# Patient Record
Sex: MALE | Race: WHITE | Employment: STUDENT | URBAN - METROPOLITAN AREA
[De-identification: names, ages, dates, MRNs, and addresses within clinical notes are randomized per-mention and may not be internally consistent; named-entity substitution may affect disease eponyms.]

---

## 2022-04-26 ENCOUNTER — HOSPITAL ENCOUNTER (OUTPATIENT)
Age: 22
Discharge: HOME | End: 2022-04-26
Payer: COMMERCIAL

## 2022-04-26 VITALS
HEART RATE: 50 BPM | RESPIRATION RATE: 16 BRPM | SYSTOLIC BLOOD PRESSURE: 127 MMHG | WEIGHT: 150 LBS | DIASTOLIC BLOOD PRESSURE: 81 MMHG | TEMPERATURE: 97 F | HEIGHT: 66 IN | BODY MASS INDEX: 24.11 KG/M2 | OXYGEN SATURATION: 98 %

## 2022-04-26 DIAGNOSIS — K64.5 EXTERNAL THROMBOSED HEMORRHOIDS: Primary | ICD-10-CM

## 2022-04-26 PROCEDURE — 99203 OFFICE O/P NEW LOW 30 MIN: CPT | Performed by: PHYSICIAN ASSISTANT

## 2022-04-26 RX ORDER — DOCUSATE SODIUM 100 MG/1
100 CAPSULE, LIQUID FILLED ORAL 2 TIMES DAILY
Qty: 60 CAPSULE | Refills: 0 | Status: SHIPPED | OUTPATIENT
Start: 2022-04-26 | End: 2022-05-26

## 2022-04-26 RX ORDER — HYDROCORTISONE 25 MG/G
1 CREAM TOPICAL 2 TIMES DAILY
Qty: 1 EACH | Refills: 0 | Status: SHIPPED | OUTPATIENT
Start: 2022-04-26 | End: 2022-05-03

## 2022-04-26 NOTE — ED INITIAL ASSESSMENT (HPI)
PATIENT STATES EXTERNAL HEMORRHOIDS- 2 WEEKS  TRIED PREPARATION H AND WARM SITZ BATHS     DENIES ANY BLOOD IN THE STOOL

## 2022-04-27 ENCOUNTER — OFFICE VISIT (OUTPATIENT)
Dept: SURGERY | Facility: CLINIC | Age: 22
End: 2022-04-27
Payer: COMMERCIAL

## 2022-04-27 VITALS
BODY MASS INDEX: 24.75 KG/M2 | DIASTOLIC BLOOD PRESSURE: 72 MMHG | HEIGHT: 66 IN | HEART RATE: 60 BPM | WEIGHT: 154 LBS | TEMPERATURE: 98 F | SYSTOLIC BLOOD PRESSURE: 127 MMHG | RESPIRATION RATE: 18 BRPM

## 2022-04-27 DIAGNOSIS — K64.5 THROMBOSED EXTERNAL HEMORRHOID: Primary | ICD-10-CM

## 2022-04-27 PROCEDURE — 46083 INC THROMBOSED HROID XTRNL: CPT | Performed by: SURGERY

## 2022-04-27 PROCEDURE — 46320 REMOVAL OF HEMORRHOID CLOT: CPT | Performed by: SURGERY

## 2022-04-27 PROCEDURE — 99243 OFF/OP CNSLTJ NEW/EST LOW 30: CPT | Performed by: SURGERY

## 2022-04-27 PROCEDURE — 3074F SYST BP LT 130 MM HG: CPT | Performed by: SURGERY

## 2022-04-27 PROCEDURE — 3078F DIAST BP <80 MM HG: CPT | Performed by: SURGERY

## 2022-04-27 PROCEDURE — 3008F BODY MASS INDEX DOCD: CPT | Performed by: SURGERY

## 2022-05-03 ENCOUNTER — OFFICE VISIT (OUTPATIENT)
Dept: SURGERY | Facility: CLINIC | Age: 22
End: 2022-05-03

## 2022-05-03 VITALS — HEART RATE: 60 BPM | TEMPERATURE: 97 F | DIASTOLIC BLOOD PRESSURE: 72 MMHG | SYSTOLIC BLOOD PRESSURE: 127 MMHG

## 2022-05-03 DIAGNOSIS — K64.5 THROMBOSED EXTERNAL HEMORRHOID: Primary | ICD-10-CM

## 2022-05-03 PROCEDURE — 3078F DIAST BP <80 MM HG: CPT | Performed by: SURGERY

## 2022-05-03 PROCEDURE — 99024 POSTOP FOLLOW-UP VISIT: CPT | Performed by: SURGERY

## 2022-05-03 PROCEDURE — 3074F SYST BP LT 130 MM HG: CPT | Performed by: SURGERY

## 2022-11-25 ENCOUNTER — HOSPITAL ENCOUNTER (OUTPATIENT)
Age: 22
Discharge: HOME | End: 2022-11-25
Payer: COMMERCIAL

## 2022-11-25 VITALS
SYSTOLIC BLOOD PRESSURE: 103 MMHG | TEMPERATURE: 98 F | OXYGEN SATURATION: 100 % | RESPIRATION RATE: 16 BRPM | HEART RATE: 61 BPM | DIASTOLIC BLOOD PRESSURE: 55 MMHG

## 2022-11-25 DIAGNOSIS — K64.4 EXTERNAL HEMORRHOID: Primary | ICD-10-CM

## 2022-11-25 PROCEDURE — 99213 OFFICE O/P EST LOW 20 MIN: CPT | Performed by: NURSE PRACTITIONER

## 2022-11-25 RX ORDER — HYDROCORTISONE ACETATE 25 MG/1
25 SUPPOSITORY RECTAL 2 TIMES DAILY PRN
Qty: 12 SUPPOSITORY | Refills: 0 | Status: SHIPPED | OUTPATIENT
Start: 2022-11-25 | End: 2022-12-25

## 2022-11-25 RX ORDER — BENZOCAINE 5.6 G/28G
1 OINTMENT TOPICAL
Qty: 28 G | Refills: 0 | Status: SHIPPED | OUTPATIENT
Start: 2022-11-25

## 2022-11-25 RX ORDER — HYDROCODONE BITARTRATE AND ACETAMINOPHEN 5; 325 MG/1; MG/1
1-2 TABLET ORAL EVERY 6 HOURS PRN
Qty: 10 TABLET | Refills: 0 | Status: SHIPPED | OUTPATIENT
Start: 2022-11-25 | End: 2022-11-30

## 2022-11-25 NOTE — ED INITIAL ASSESSMENT (HPI)
Hemorrhoid pain x 2 days. Had previous hemorrhoid surgically removed. Denies rectal bleeding but c/o swelling and pain.

## 2022-11-25 NOTE — DISCHARGE INSTRUCTIONS
Anusol suppositories as directed. May use topical over-the-counter witch Hazel to the area. May also use Tucks or Preparation H pads. May use the prescribed benzocaine rectal ointment sparingly if needed for rectal pain. Sit in warm bath water for 10-15 minutes per day. Do this 2-3 times per day. Add fiber to your diet (fresh fruits and vegetables). It is ok to take Metamucil or a stool softener over the counter. Take your stool softener every day. [facility] for more severe pain. Narcotic medications may cause drowsiness. Do not drive or operate machinery while taking these medications. Exercise caution if you have sleep apnea. Narcotics may lower your respiration rate and cause you to stop breathing. Narcotics can become habit-forming. Narcotic medications may also cause constipation. You may want to take a stool softener while taking narcotics. Drink plenty of fluids.   Avoid lingering while sitting on toilet    Recommend close follow-up with Dr. David Mann and your gastroenterologist.  Seek immediate medical attention if your symptoms worsen

## 2022-11-28 ENCOUNTER — TELEPHONE (OUTPATIENT)
Facility: LOCATION | Age: 22
End: 2022-11-28

## 2022-11-28 NOTE — TELEPHONE ENCOUNTER
Pt is UC 11/25 for hemorrhoids. Pt last in office 5/3/2022 w/ [facility]. Pt states he needs to be seen asap due to pain. [facility] has no openings tomorrow. Can he wait until Thursday?   #[phone]

## 2022-11-29 ENCOUNTER — OFFICE VISIT (OUTPATIENT)
Facility: LOCATION | Age: 22
End: 2022-11-29
Payer: COMMERCIAL

## 2022-11-29 ENCOUNTER — TELEPHONE (OUTPATIENT)
Facility: LOCATION | Age: 22
End: 2022-11-29

## 2022-11-29 DIAGNOSIS — K64.5 THROMBOSED EXTERNAL HEMORRHOID: ICD-10-CM

## 2022-11-29 DIAGNOSIS — K64.9 HEMORRHOIDS, UNSPECIFIED HEMORRHOID TYPE: Primary | ICD-10-CM

## 2022-11-29 PROCEDURE — 88304 TISSUE EXAM BY PATHOLOGIST: CPT | Performed by: SURGERY

## 2022-11-29 PROCEDURE — 46320 REMOVAL OF HEMORRHOID CLOT: CPT | Performed by: SURGERY

## 2022-11-29 PROCEDURE — 99213 OFFICE O/P EST LOW 20 MIN: CPT | Performed by: SURGERY

## 2022-11-29 NOTE — TELEPHONE ENCOUNTER
pt says that he has a burning pain still where the in-office excision of the hemorrhoid was done. Says that he has already tried sitz baths, and that he has already finished the hydrocodone that was prescribed on 11/25. He's wondering if there's anything we can prescribe or if there's anything else he can do for the pain.   Paged PA s  Katey Young they will prescribe some oxy and he will need to supplement with tylenol and motrin    Tried to call to inform pt, left vague message

## 2022-12-08 ENCOUNTER — TELEPHONE (OUTPATIENT)
Facility: LOCATION | Age: 22
End: 2022-12-08

## 2022-12-08 NOTE — TELEPHONE ENCOUNTER
Pt called asking about copay on follow up appointment. Says that when he had the procedure last he wasn't charged a copay for the follow up. He called his insurance and they informed him that he shouldn't be paying a copay for this visit.  Changing the appt type to a post-op appointment

## 2022-12-09 ENCOUNTER — OFFICE VISIT (OUTPATIENT)
Facility: LOCATION | Age: 22
End: 2022-12-09

## 2022-12-09 VITALS — HEART RATE: 58 BPM | TEMPERATURE: 96 F

## 2022-12-09 DIAGNOSIS — Z98.890 POST-OPERATIVE STATE: Primary | ICD-10-CM

## 2022-12-09 PROCEDURE — 99024 POSTOP FOLLOW-UP VISIT: CPT | Performed by: PHYSICIAN ASSISTANT

## 2022-12-16 PROBLEM — K64.5 THROMBOSED EXTERNAL HEMORRHOID: Status: RESOLVED | Noted: 2022-04-27 | Resolved: 2022-12-16
